# Patient Record
Sex: FEMALE | Race: WHITE | URBAN - METROPOLITAN AREA
[De-identification: names, ages, dates, MRNs, and addresses within clinical notes are randomized per-mention and may not be internally consistent; named-entity substitution may affect disease eponyms.]

---

## 2021-04-28 DIAGNOSIS — E78.5 HYPERLIPIDEMIA, UNSPECIFIED HYPERLIPIDEMIA TYPE: ICD-10-CM

## 2021-04-28 DIAGNOSIS — F41.8 MIXED ANXIETY AND DEPRESSIVE DISORDER: ICD-10-CM

## 2021-04-28 DIAGNOSIS — R00.2 INTERMITTENT PALPITATIONS: ICD-10-CM

## 2021-04-28 DIAGNOSIS — N18.2 CHRONIC KIDNEY DISEASE (CKD), STAGE II (MILD): ICD-10-CM

## 2021-04-28 RX ORDER — ESCITALOPRAM OXALATE 20 MG/1
20 TABLET ORAL DAILY
COMMUNITY

## 2021-04-28 RX ORDER — ROSUVASTATIN CALCIUM 5 MG/1
5 TABLET, COATED ORAL DAILY
COMMUNITY

## 2021-04-28 RX ORDER — METHYLPREDNISOLONE 4 MG/1
2 TABLET ORAL DAILY
COMMUNITY
End: 2021-04-29

## 2021-04-28 RX ORDER — ONDANSETRON 4 MG/1
4 TABLET, FILM COATED ORAL EVERY 8 HOURS PRN
COMMUNITY

## 2021-04-28 RX ORDER — BUTALBITAL, ACETAMINOPHEN AND CAFFEINE 50; 325; 40 MG/1; MG/1; MG/1
1 TABLET ORAL EVERY 4 HOURS PRN
COMMUNITY

## 2021-04-28 RX ORDER — AMITRIPTYLINE HYDROCHLORIDE 10 MG/1
10 TABLET, FILM COATED ORAL NIGHTLY
COMMUNITY

## 2021-04-28 RX ORDER — HYDROXYZINE HYDROCHLORIDE 25 MG/1
0.5 TABLET, FILM COATED ORAL 3 TIMES DAILY PRN
COMMUNITY

## 2021-04-29 ENCOUNTER — VIRTUAL VISIT (OUTPATIENT)
Dept: ENDOCRINOLOGY | Age: 36
End: 2021-04-29
Payer: COMMERCIAL

## 2021-04-29 DIAGNOSIS — E66.01 MORBID OBESITY DUE TO EXCESS CALORIES (HCC): Primary | ICD-10-CM

## 2021-04-29 DIAGNOSIS — Z87.442 HISTORY OF KIDNEY STONES: ICD-10-CM

## 2021-04-29 PROCEDURE — 99203 OFFICE O/P NEW LOW 30 MIN: CPT | Performed by: INTERNAL MEDICINE

## 2021-04-29 PROCEDURE — G8421 BMI NOT CALCULATED: HCPCS | Performed by: INTERNAL MEDICINE

## 2021-04-29 PROCEDURE — G8427 DOCREV CUR MEDS BY ELIG CLIN: HCPCS | Performed by: INTERNAL MEDICINE

## 2021-04-29 RX ORDER — PHENTERMINE HYDROCHLORIDE 37.5 MG/1
37.5 CAPSULE ORAL EVERY MORNING
Qty: 30 CAPSULE | Refills: 0 | Status: SHIPPED | OUTPATIENT
Start: 2021-04-29 | End: 2021-05-27

## 2021-04-29 RX ORDER — AMOXICILLIN 500 MG/1
CAPSULE ORAL
COMMUNITY
Start: 2021-04-22 | End: 2021-05-27 | Stop reason: ALTCHOICE

## 2021-04-29 NOTE — PROGRESS NOTES
Patient ID:   David Valencia is a 39 y.o. female    Chief Complaint:   David Valencia is seen for initial evaluation of obesity   Referred by Gayle Nesbitt NP     Pursuant to the emergency declaration under the [facility], [facility] authority and the [employer] and [employer] Act this visit was conducted with the use of [facility] interactive audio and video telecommunications system that permits real time communication between the patient and provider to substitute for an in-person clinic visit to reduce the patient's risk of exposure to COVID-19 and provide necessary medical care. Because this was a Telehealth visit, evaluation of the following organ systems was limited: Vitals, Constitutional, EENT, Resp, CV, GI, , MS, Neuro, Skin. Heme. Lymph, Imm. David Valencia was in the room. Provider was at home. No one else was involved. Virtual visit was also done to preserve PPE's. Subjective:   David Valencia is in touch with dietitian since Oct 2020. In high school she was 100lbs. In 2017 she weighed around 140. Now she is 204 lbs. Meals: 1200 calories , 4 small meals a day. Snacks may be almonds, hummus carrots, fruits. Exercise: Yoga, 10 K steps daily.      She bruises easily  Ring size has changed, shoe sie is stable   S/p uterine ablation     She has CKD stage 2 due to kidney stones   HTN, HPL as per pcp     The following portions of the patient's history were reviewed and updated as appropriate:      Family History   Problem Relation Age of Onset    Breast Cancer Mother     High Blood Pressure Mother     Other Mother         Malignant Neoplastic Disease    Depression Father     Heart Attack Father     High Blood Pressure Father     Anxiety Disorder Father     Obesity Father     Mental Illness Father     Thyroid Disease Father     Cervical Cancer Sister     Other Sister Malignant Neoplastic Disease    Depression Sister     Anxiety Disorder Sister     Mental Illness Sister     Obesity Paternal Aunt     Thyroid Disease Paternal Aunt     Colon Cancer Maternal Grandmother     Osteoporosis Maternal Grandmother     Other Maternal Grandmother         Malignant Neoplastic Disease    Heart Attack Paternal Grandmother     Diabetes Paternal Grandmother     Anxiety Disorder Paternal Grandmother     Mental Illness Paternal Grandmother     Obesity Paternal Grandmother     Depression Paternal Grandmother          Social History     Socioeconomic History    Marital status: Unknown     Spouse name: Not on file    Number of children: Not on file    Years of education: Not on file    Highest education level: Not on file   Occupational History    Not on file   Social Needs    Financial resource strain: Not on file    Food insecurity     Worry: Not on file     Inability: Not on file    Transportation needs     Medical: Not on file     Non-medical: Not on file   Tobacco Use    Smoking status: Never Smoker    Smokeless tobacco: Never Used   Substance and Sexual Activity    Alcohol use:  Yes     Alcohol/week: 2.0 - 4.0 standard drinks     Types: 1 - 2 Glasses of wine, 1 - 2 Cans of beer per week    Drug use: Never    Sexual activity: Not on file   Lifestyle    Physical activity     Days per week: Not on file     Minutes per session: Not on file    Stress: Not on file   Relationships    Social connections     Talks on phone: Not on file     Gets together: Not on file     Attends Taoism service: Not on file     Active member of club or organization: Not on file     Attends meetings of clubs or organizations: Not on file     Relationship status: Not on file    Intimate partner violence     Fear of current or ex partner: Not on file     Emotionally abused: Not on file     Physically abused: Not on file     Forced sexual activity: Not on file   Other Topics Concern    Not on pain, diarrhea, constipation. Endocrine: Negative for cold intolerance, heat intolerance, polydipsia, polyphagia and polyuria. Genitourinary: Negative for dysuria, urgency, frequency, hematuria and flank pain. Musculoskeletal: Negative for myalgias, back pain, arthralgias and neck pain. Skin/Nail: Negative for rash, itching. Normal nails. Neurological: Negative for seizures, weakness, light-headedness, numbness and headaches. Hematological/ Lymph nodes: Negative for adenopathy. Does not bruise/bleed easily. Psychiatric/Behavioral: Negative for suicidal ideas, depression, anxiety, sleep disturbance and decreased concentration. Objective:   Physical Exam:    There were no vitals taken for this visit. Constitutional: Patient is oriented to person, place, and time. Patient appears well-developed and well-nourished. Pulmonary/Chest: Effort normal   Neurological: Patient is alert and oriented to person, place, and time. Psychiatric: Patient has a normal mood and affect. Patient behavior is normal.     Lab Review:      No results found for any previous visit. Assessment and [facility] [address] was seen today for consultation. Diagnoses and all orders for this visit:    Morbid obesity due to excess calories (HCC)  -     T4, Free; Future  -     T3, Free; Future  -     SALIVARY CORTISOL; Standing  -     phentermine 37.5 MG capsule; Take 1 capsule by mouth every morning for 30 days. -     Insulin-Like Growth Factor; Future    History of kidney stones        1: Obesity     She reports normal TSH     Will check Free T4 and Fee T3     Saliva cortisol x 3      Discussed weight loss options of exercise (150 minutes per week) plus diet plans   Diet plans may include intermittent fasting, low carb diet, weight watchers, mediterranean diet or caloric restriction ( 900 or less per day) .      Can consider weight loss meds if lifestyle changes fail     H/o kidney stones: Qsymia is CI   Can try adipex   She is ready for it   Start now     RTC in 4 weeks       Electronically signed by Joseph Kidd MD on 4/29/2021 at 3:14 PM

## 2021-05-27 ENCOUNTER — VIRTUAL VISIT (OUTPATIENT)
Dept: ENDOCRINOLOGY | Age: 36
End: 2021-05-27
Payer: COMMERCIAL

## 2021-05-27 DIAGNOSIS — E66.01 MORBID OBESITY DUE TO EXCESS CALORIES (HCC): Primary | ICD-10-CM

## 2021-05-27 DIAGNOSIS — Z87.442 HISTORY OF KIDNEY STONES: ICD-10-CM

## 2021-05-27 PROCEDURE — 99213 OFFICE O/P EST LOW 20 MIN: CPT | Performed by: INTERNAL MEDICINE

## 2021-05-27 PROCEDURE — G8427 DOCREV CUR MEDS BY ELIG CLIN: HCPCS | Performed by: INTERNAL MEDICINE

## 2021-05-27 RX ORDER — UBROGEPANT 100 MG/1
TABLET ORAL
COMMUNITY

## 2021-05-27 NOTE — LETTER
[facility]  [place] [id number] [id number] [address]  Phone: [phone]  Fax: [phone]    Emeli Peterson MD    May 27, 2021     Woody Holes, APRN - NP  [facility] [address] [place] [id number]    Patient: Jen Chinchilla   MR Number: <[id number]>   YOB: 1985   Date of Visit: 5/27/2021       Dear Fadi Phoenix:    Thank you for referring Eri Glasgow to me for evaluation/treatment. Below are the relevant portions of my assessment and plan of care. If you have questions, please do not hesitate to call me. I look forward to following Doris along with you.     Sincerely,        Emeli Peterson MD

## 2021-05-27 NOTE — PROGRESS NOTES
Pressure Mother     Other Mother         Malignant Neoplastic Disease    Depression Father     Heart Attack Father     High Blood Pressure Father     Anxiety Disorder Father     Obesity Father     Mental Illness Father     Thyroid Disease Father     Cervical Cancer Sister     Other Sister         Malignant Neoplastic Disease    Depression Sister     Anxiety Disorder Sister     Mental Illness Sister     Obesity Paternal Aunt     Thyroid Disease Paternal Aunt     Colon Cancer Maternal Grandmother     Osteoporosis Maternal Grandmother     Other Maternal Grandmother         Malignant Neoplastic Disease    Heart Attack Paternal Grandmother     Diabetes Paternal Grandmother     Anxiety Disorder Paternal Grandmother     Mental Illness Paternal Grandmother     Obesity Paternal Grandmother     Depression Paternal Grandmother          Social History     Socioeconomic History    Marital status: Unknown     Spouse name: Not on file    Number of children: Not on file    Years of education: Not on file    Highest education level: Not on file   Occupational History    Not on file   Tobacco Use    Smoking status: Never Smoker    Smokeless tobacco: Never Used   Vaping Use    Vaping Use: Never used   Substance and Sexual Activity    Alcohol use: Yes     Alcohol/week: 2.0 - 4.0 standard drinks     Types: 1 - 2 Glasses of wine, 1 - 2 Cans of beer per week    Drug use: Never    Sexual activity: Not on file   Other Topics Concern    Not on file   Social History Narrative    Not on file     Social Determinants of Health     Financial Resource Strain:     Difficulty of Paying Living Expenses:    Food Insecurity:     Worried About Running Out of Food in the Last Year:     [address] in the Last Year:    Transportation Needs:     Lack of Transportation (Medical):      Lack of Transportation (Non-Medical):    Physical Activity:     Days of Exercise per Week:     Minutes of Exercise per Session: Stress:     Feeling of Stress :    Social Connections:     Frequency of Communication with Friends and Family:     Frequency of Social Gatherings with Friends and Family:     Attends Taoism Services:     Active Member of Clubs or Organizations:     Attends Club or Organization Meetings:     Marital Status:    Intimate Partner Violence:     Fear of Current or Ex-Partner:     Emotionally Abused:     Physically Abused:     Sexually Abused:          Past Medical History:   Diagnosis Date    Chronic kidney disease (CKD), stage II (mild)     Chronic UTI     Gallstone     Hyperlipidemia     Hypertension     Intermittent palpitations     Kidney stone     Migraine headache     Mixed anxiety and depressive disorder     Obesity, unspecified     Shingles     Tuberculosis          Past Surgical History:   Procedure Laterality Date     SECTION      CHOLECYSTECTOMY      TUBAL LIGATION         No Known Allergies      Current Outpatient Medications:     Ubrogepant (UBRELVY) 100 MG TABS, Ubrelvy 100 mg tablet  TAKE ONE TABLET BY MOUTH AT ONSET OF MIGRAINE.  DO NOT TAKE MORE THAN 1 TABLET IN 24 HOURS., Disp: , Rfl:     amitriptyline (ELAVIL) 10 MG tablet, Take 10 mg by mouth nightly, Disp: , Rfl:     butalbital-acetaminophen-caffeine (FIORICET, ESGIC) -40 MG per tablet, Take 1 tablet by mouth every 4 hours as needed for Headaches, Disp: , Rfl:     vitamin D 25 MCG (1000 UT) CAPS, Take by mouth daily, Disp: , Rfl:     escitalopram (LEXAPRO) 20 MG tablet, Take 20 mg by mouth daily, Disp: , Rfl:     hydrOXYzine (ATARAX) 25 MG tablet, Take 0.5 mg by mouth 3 times daily as needed for Itching, Disp: , Rfl:     ondansetron (ZOFRAN) 4 MG tablet, Take 4 mg by mouth every 8 hours as needed for Nausea or Vomiting, Disp: , Rfl:     rosuvastatin (CRESTOR) 5 MG tablet, Take 5 mg by mouth daily, Disp: , Rfl:     Review of Systems:    Constitutional: Negative for fever, chills, and unexpected weight change. HENT: Negative for congestion, ear pain, rhinorrhea,  sore throat and trouble swallowing. Eyes: Negative for photophobia, redness, itching. Respiratory: Negative for cough, shortness of breath and sputum. Cardiovascular: Negative for chest pain, palpitations and leg swelling. Gastrointestinal: Negative for nausea, vomiting, abdominal pain, diarrhea, constipation. Endocrine: Negative for cold intolerance, heat intolerance, polydipsia, polyphagia and polyuria. Genitourinary: Negative for dysuria, urgency, frequency, hematuria and flank pain. Musculoskeletal: Negative for myalgias, back pain, arthralgias and neck pain. Skin/Nail: Negative for rash, itching. Normal nails. Neurological: Negative for seizures, weakness, light-headedness, numbness and headaches. Hematological/ Lymph nodes: Negative for adenopathy. Does not bruise/bleed easily. Psychiatric/Behavioral: Negative for suicidal ideas, depression, anxiety, sleep disturbance and decreased concentration. Objective:   Physical Exam:    There were no vitals taken for this visit. Constitutional: Patient is oriented to person, place, and time. Patient appears well-developed and well-nourished. Pulmonary/Chest: Effort normal   Neurological: Patient is alert and oriented to person, place, and time. Psychiatric: Patient has a normal mood and affect. Patient behavior is normal.     Lab Review:      No results found for any previous visit. Assessment and Plan       Henrique Baig was seen today for follow-up.     Diagnoses and all orders for this visit:    Morbid obesity due to excess calories ([address]    History of kidney stones        1: Obesity     She reports normal TSH     IGF-1,  Free T4 and Fee T3 normal - May 2021     Pending: Saliva cortisol x 3   She did them 2 days ago     Discussed weight loss options of exercise (150 minutes per week) plus diet plans   Diet plans may include intermittent fasting, low carb diet, weight watchers, mediterranean diet or caloric restriction ( 900 or less per day) .      Can consider weight loss meds if lifestyle changes fail     H/o kidney stones: Qsymia is CI   Adipex stopped due to high blood pressure  Contrave not a good option as she is on other centrally acting meds   Liraglutide is an option later on     RTC prn     Electronically signed by Yosef Fernandez MD on 5/27/2021 at 8:35 AM

## 2021-06-14 ENCOUNTER — TELEPHONE (OUTPATIENT)
Dept: ENDOCRINOLOGY | Age: 36
End: 2021-06-14

## 2022-07-06 LAB
CHOLESTEROL, TOTAL: 188 MG/DL (ref 100–199)
COMMENT: ABNORMAL
HDLC SERPL-MCNC: 41 MG/DL
LDL CHOLESTEROL CALCULATED: 122 MG/DL (ref 0–99)
TRIGL SERPL-MCNC: 139 MG/DL (ref 0–149)
VITAMIN B-12: 403 PG/ML (ref 232–1245)
VITAMIN D 25-HYDROXY: 36.6 NG/ML (ref 30–100)
VLDLC SERPL CALC-MCNC: 25 MG/DL (ref 5–40)

## 2022-08-29 LAB
LAB AP CASE REPORT: NORMAL
LAB AP CLINICAL DIAGNOSIS: NORMAL
Lab: NORMAL
PATHOLOGY REPORT FINAL DIAGNOSIS: NORMAL